# Patient Record
Sex: FEMALE | Race: WHITE | NOT HISPANIC OR LATINO | URBAN - METROPOLITAN AREA
[De-identification: names, ages, dates, MRNs, and addresses within clinical notes are randomized per-mention and may not be internally consistent; named-entity substitution may affect disease eponyms.]

---

## 2017-07-27 ENCOUNTER — APPOINTMENT (OUTPATIENT)
Dept: URBAN - METROPOLITAN AREA CLINIC 219 | Age: 67
Setting detail: DERMATOLOGY
End: 2017-07-31

## 2017-07-27 DIAGNOSIS — L82.1 OTHER SEBORRHEIC KERATOSIS: ICD-10-CM

## 2017-07-27 DIAGNOSIS — D22 MELANOCYTIC NEVI: ICD-10-CM

## 2017-07-27 DIAGNOSIS — L30.4 ERYTHEMA INTERTRIGO: ICD-10-CM

## 2017-07-27 DIAGNOSIS — L71.8 OTHER ROSACEA: ICD-10-CM

## 2017-07-27 DIAGNOSIS — L57.0 ACTINIC KERATOSIS: ICD-10-CM

## 2017-07-27 DIAGNOSIS — L82.0 INFLAMED SEBORRHEIC KERATOSIS: ICD-10-CM

## 2017-07-27 PROBLEM — L23.7 ALLERGIC CONTACT DERMATITIS DUE TO PLANTS, EXCEPT FOOD: Status: ACTIVE | Noted: 2017-07-27

## 2017-07-27 PROBLEM — L70.0 ACNE VULGARIS: Status: ACTIVE | Noted: 2017-07-27

## 2017-07-27 PROBLEM — D22.71 MELANOCYTIC NEVI OF RIGHT LOWER LIMB, INCLUDING HIP: Status: ACTIVE | Noted: 2017-07-27

## 2017-07-27 PROBLEM — I10 ESSENTIAL (PRIMARY) HYPERTENSION: Status: ACTIVE | Noted: 2017-07-27

## 2017-07-27 PROCEDURE — 17110 DESTRUCT B9 LESION 1-14: CPT

## 2017-07-27 PROCEDURE — OTHER PRESCRIPTION: OTHER

## 2017-07-27 PROCEDURE — OTHER TREATMENT REGIMEN: OTHER

## 2017-07-27 PROCEDURE — 17000 DESTRUCT PREMALG LESION: CPT | Mod: 59

## 2017-07-27 PROCEDURE — OTHER COUNSELING: OTHER

## 2017-07-27 PROCEDURE — OTHER REASSURANCE: OTHER

## 2017-07-27 PROCEDURE — 99213 OFFICE O/P EST LOW 20 MIN: CPT | Mod: 25

## 2017-07-27 PROCEDURE — OTHER OBSERVATION: OTHER

## 2017-07-27 PROCEDURE — OTHER LIQUID NITROGEN: OTHER

## 2017-07-27 RX ORDER — IVERMECTIN 10 MG/G
APPLY CREAM TOPICAL AT NIGHT
Qty: 1 | Refills: 3 | Status: ERX | COMMUNITY
Start: 2017-07-27

## 2017-07-27 RX ORDER — AZELAIC ACID 0.15 G/G
APPLY GEL TOPICAL
Qty: 3 | Refills: 3 | Status: ERX

## 2017-07-27 ASSESSMENT — LOCATION DETAILED DESCRIPTION DERM
LOCATION DETAILED: LEFT INFERIOR FLANK
LOCATION DETAILED: UPPER STERNUM
LOCATION DETAILED: RIGHT ACHILLES SKIN
LOCATION DETAILED: LEFT SUPERIOR UPPER BACK
LOCATION DETAILED: LEFT LATERAL DISTAL PRETIBIAL REGION
LOCATION DETAILED: RIGHT ULNAR DORSAL HAND
LOCATION DETAILED: RIGHT PROXIMAL PRETIBIAL REGION
LOCATION DETAILED: INFERIOR THORACIC SPINE

## 2017-07-27 ASSESSMENT — LOCATION ZONE DERM
LOCATION ZONE: LEG
LOCATION ZONE: HAND
LOCATION ZONE: TRUNK

## 2017-07-27 ASSESSMENT — LOCATION SIMPLE DESCRIPTION DERM
LOCATION SIMPLE: LEFT PRETIBIAL REGION
LOCATION SIMPLE: ABDOMEN
LOCATION SIMPLE: RIGHT PRETIBIAL REGION
LOCATION SIMPLE: RIGHT ACHILLES SKIN
LOCATION SIMPLE: UPPER BACK
LOCATION SIMPLE: LEFT UPPER BACK
LOCATION SIMPLE: CHEST
LOCATION SIMPLE: RIGHT HAND

## 2017-07-27 NOTE — PROCEDURE: OBSERVATION
X Size Of Lesion In Cm (Optional): 0
Body Location Override (Optional - Billing Will Still Be Based On Selected Body Map Location If Applicable): Legs
Detail Level: Detailed

## 2017-07-27 NOTE — PROCEDURE: TREATMENT REGIMEN
Detail Level: Detailed
Continue Regimen: Sunscreen daily\\nDoxycycline Monohydrate 50mg- Take one by mouth once a day with food, may increase to twice a day as needed for flares (consider tapering Doxycycline if improved with regular use of the topicals)\\nFinacea Gel in the morning
Modify Regimen: Add Soolantra at night
Continue Regimen: Loprox Gel twice a day as needed for irritation under the breasts/groin irritation\\nDrying technique\\nZeasorb AF Powder\\nT-Shirt or absorbent material to absorb moisture in skin folds

## 2017-07-27 NOTE — PROCEDURE: LIQUID NITROGEN
Detail Level: Detailed
Post-Care Instructions: I reviewed with the patient in detail post-care instructions. Patient is to wear sunprotection, and avoid picking at any of the treated lesions. Pt may apply Vaseline to crusted or scabbing areas.
Render Post-Care Instructions In Note?: no
Duration Of Freeze Thaw-Cycle (Seconds): 0
Consent: The patient's consent was obtained including but not limited to risks of crusting, scabbing, blistering, scarring, darker or lighter pigmentary change, recurrence, incomplete removal and infection.
Number Of Freeze-Thaw Cycles: 1 freeze-thaw cycle
Medical Necessity Clause: This procedure was medically necessary because the lesions that were treated were:
Medical Necessity Information: It is in your best interest to select a reason for this procedure from the list below. All of these items fulfill various CMS LCD requirements except the new and changing color options.

## 2017-08-01 ENCOUNTER — RX ONLY (RX ONLY)
Age: 67
End: 2017-08-01

## 2017-08-01 RX ORDER — METRONIDAZOLE 7.5 MG/G
APPLY GEL TOPICAL TWICE A DAY
Qty: 1 | Refills: 3 | Status: ERX | COMMUNITY
Start: 2017-08-01

## 2017-09-20 ENCOUNTER — RX ONLY (RX ONLY)
Age: 67
End: 2017-09-20

## 2017-09-20 RX ORDER — DOXYCYCLINE 50 MG/1
CAPSULE ORAL TWICE A DAY
Qty: 60 | Refills: 5 | Status: ERX

## 2018-08-06 ENCOUNTER — APPOINTMENT (OUTPATIENT)
Dept: URBAN - METROPOLITAN AREA CLINIC 219 | Age: 68
Setting detail: DERMATOLOGY
End: 2018-08-06

## 2018-08-06 DIAGNOSIS — L82.1 OTHER SEBORRHEIC KERATOSIS: ICD-10-CM

## 2018-08-06 DIAGNOSIS — L72.0 EPIDERMAL CYST: ICD-10-CM

## 2018-08-06 DIAGNOSIS — L30.4 ERYTHEMA INTERTRIGO: ICD-10-CM

## 2018-08-06 DIAGNOSIS — L81.4 OTHER MELANIN HYPERPIGMENTATION: ICD-10-CM

## 2018-08-06 DIAGNOSIS — D22 MELANOCYTIC NEVI: ICD-10-CM

## 2018-08-06 DIAGNOSIS — L71.8 OTHER ROSACEA: ICD-10-CM

## 2018-08-06 PROBLEM — I10 ESSENTIAL (PRIMARY) HYPERTENSION: Status: ACTIVE | Noted: 2018-08-06

## 2018-08-06 PROBLEM — D22.71 MELANOCYTIC NEVI OF RIGHT LOWER LIMB, INCLUDING HIP: Status: ACTIVE | Noted: 2018-08-06

## 2018-08-06 PROCEDURE — OTHER OBSERVATION: OTHER

## 2018-08-06 PROCEDURE — OTHER PRESCRIPTION: OTHER

## 2018-08-06 PROCEDURE — 99214 OFFICE O/P EST MOD 30 MIN: CPT

## 2018-08-06 PROCEDURE — OTHER REASSURANCE: OTHER

## 2018-08-06 PROCEDURE — OTHER TREATMENT REGIMEN: OTHER

## 2018-08-06 PROCEDURE — OTHER COUNSELING: OTHER

## 2018-08-06 RX ORDER — CICLOPIROX 7.7 MG/G
APPLY GEL TOPICAL
Qty: 1 | Refills: 5 | Status: ERX

## 2018-08-06 ASSESSMENT — LOCATION SIMPLE DESCRIPTION DERM
LOCATION SIMPLE: RIGHT PRETIBIAL REGION
LOCATION SIMPLE: LEFT CALF
LOCATION SIMPLE: LEFT BREAST
LOCATION SIMPLE: ABDOMEN
LOCATION SIMPLE: RIGHT UPPER BACK
LOCATION SIMPLE: RIGHT CALF
LOCATION SIMPLE: CHEST

## 2018-08-06 ASSESSMENT — LOCATION DETAILED DESCRIPTION DERM
LOCATION DETAILED: LEFT MEDIAL BREAST 7-8:00 REGION
LOCATION DETAILED: RIGHT MEDIAL UPPER BACK
LOCATION DETAILED: LEFT DISTAL CALF
LOCATION DETAILED: RIGHT RIB CAGE
LOCATION DETAILED: RIGHT DISTAL CALF
LOCATION DETAILED: RIGHT PROXIMAL PRETIBIAL REGION
LOCATION DETAILED: RIGHT LATERAL SUPERIOR CHEST

## 2018-08-06 ASSESSMENT — LOCATION ZONE DERM
LOCATION ZONE: LEG
LOCATION ZONE: TRUNK

## 2018-08-06 NOTE — PROCEDURE: OBSERVATION
Detail Level: Detailed
X Size Of Lesion In Cm (Optional): 0
Body Location Override (Optional - Billing Will Still Be Based On Selected Body Map Location If Applicable): Legs

## 2018-08-06 NOTE — PROCEDURE: TREATMENT REGIMEN
Detail Level: Detailed
Continue Regimen: Sunscreen daily\\nFinacea Gel in the morning as needed \\nSoolantra at night as needed
Continue Regimen: Loprox Gel twice a day as needed for irritation under the breasts/groin irritation\\nDrying technique\\nZeasorb AF Powder\\nT-Shirt or absorbent material to absorb moisture in skin folds